# Patient Record
Sex: FEMALE | Race: OTHER | Employment: FULL TIME | URBAN - METROPOLITAN AREA
[De-identification: names, ages, dates, MRNs, and addresses within clinical notes are randomized per-mention and may not be internally consistent; named-entity substitution may affect disease eponyms.]

---

## 2017-01-04 ENCOUNTER — OFFICE VISIT (OUTPATIENT)
Dept: PODIATRY CLINIC | Facility: CLINIC | Age: 27
End: 2017-01-04

## 2017-01-04 ENCOUNTER — HOSPITAL ENCOUNTER (OUTPATIENT)
Dept: GENERAL RADIOLOGY | Facility: HOSPITAL | Age: 27
Discharge: HOME | End: 2017-01-04
Attending: PODIATRIST
Payer: COMMERCIAL

## 2017-01-04 DIAGNOSIS — M79.671 BILATERAL FOOT PAIN: ICD-10-CM

## 2017-01-04 DIAGNOSIS — M79.672 BILATERAL FOOT PAIN: ICD-10-CM

## 2017-01-04 DIAGNOSIS — M79.671 BILATERAL FOOT PAIN: Primary | ICD-10-CM

## 2017-01-04 DIAGNOSIS — M79.672 BILATERAL FOOT PAIN: Primary | ICD-10-CM

## 2017-01-04 DIAGNOSIS — M20.12 HALLUX VALGUS, LEFT: ICD-10-CM

## 2017-01-04 DIAGNOSIS — M20.11 HALLUX VALGUS, RIGHT: ICD-10-CM

## 2017-01-04 PROCEDURE — 99212 OFFICE O/P EST SF 10 MIN: CPT | Performed by: PODIATRIST

## 2017-01-04 PROCEDURE — 99243 OFF/OP CNSLTJ NEW/EST LOW 30: CPT | Performed by: PODIATRIST

## 2017-01-04 PROCEDURE — 73630 X-RAY EXAM OF FOOT: CPT

## 2017-01-04 NOTE — PROGRESS NOTES
HPI:    Patient ID: Leonora Christie is a 27year old female. HPI  This pleasant 27-year-old female presents as a new patient to me on consult from [address] She is accompanied today by family and I used the translation telephone line to communicate.   Hi understanding of my instructions. I did ask for further questions and did my best to answer. I believe that she is well informed. They seem to desire surgery in the very near future and we will schedule to her convenience.   I plan to do the left foot fi

## 2017-01-05 ENCOUNTER — TELEPHONE (OUTPATIENT)
Dept: PODIATRY CLINIC | Facility: CLINIC | Age: 27
End: 2017-01-05

## 2017-01-05 NOTE — TELEPHONE ENCOUNTER
Called pt using Language line Ugandan interpretor ID # [id number], Sin Ferraro -- Offered to schedule surgery for pt. Pt states she needs to discuss this with her . Informed pt that I will call her back next week. Pt verbalized understanding.

## 2017-01-18 NOTE — TELEPHONE ENCOUNTER
Called pt using Language Line Lithuanian interpretor ID # [id number] -- pt states her in law is having surgery in February and she will have to call back to let me know when she can have surgery.

## 2017-02-27 ENCOUNTER — TELEPHONE (OUTPATIENT)
Dept: PODIATRY CLINIC | Facility: CLINIC | Age: 27
End: 2017-02-27

## 2017-03-02 NOTE — TELEPHONE ENCOUNTER
[facility] [address] [facility] for outpatient surgery. No PA is required. Confirmation # [id number].

## 2017-03-02 NOTE — TELEPHONE ENCOUNTER
Called pt using Language Line Arabic interpretor ID # [id number] -- Scheduled surgery with [facility] at [address] on 03/29/17 for left foot bunionectomy.    Pt informed that [address] will be calling 1-2 days before surgery to discuss the following:  - Time of surgery and whe

## 2017-03-28 ENCOUNTER — TELEPHONE (OUTPATIENT)
Dept: PODIATRY CLINIC | Facility: CLINIC | Age: 27
End: 2017-03-28

## 2017-03-28 NOTE — TELEPHONE ENCOUNTER
Vinay Brown wants to know if patient should be moved up for surgery tomorrow since another patient will not be coming in. Please advise. Thank you.

## 2017-03-31 ENCOUNTER — TELEPHONE (OUTPATIENT)
Dept: PODIATRY CLINIC | Facility: CLINIC | Age: 27
End: 2017-03-31

## 2017-03-31 NOTE — TELEPHONE ENCOUNTER
Discussed with Dr. Sandip Gray nausea from Tylenol #3 given post op. Pt to stop T3's and may take ibuprfen three times a day with food and may take plain tylenol or tylenol extra strength  In between doses.   Discussed with pt's uncle Michelle Carrillo who is translating

## 2017-04-07 ENCOUNTER — OFFICE VISIT (OUTPATIENT)
Dept: PODIATRY CLINIC | Facility: CLINIC | Age: 27
End: 2017-04-07

## 2017-04-07 DIAGNOSIS — M20.12 HALLUX VALGUS, LEFT: Primary | ICD-10-CM

## 2017-04-07 PROCEDURE — 99024 POSTOP FOLLOW-UP VISIT: CPT | Performed by: PODIATRIST

## 2017-04-07 PROCEDURE — 99212 OFFICE O/P EST SF 10 MIN: CPT | Performed by: PODIATRIST

## 2017-04-07 NOTE — PROGRESS NOTES
HPI:    Patient ID: Artemio Schaffer is a 27year old female. HPI  56-year-old female presents 1 week postsurgical with no specific complaints or concerns. She is accompanied today by her dad.   Patient is doing very well in fact states that she is taking ib

## 2017-04-12 ENCOUNTER — HOSPITAL ENCOUNTER (OUTPATIENT)
Dept: GENERAL RADIOLOGY | Facility: HOSPITAL | Age: 27
Discharge: HOME | End: 2017-04-12
Attending: PODIATRIST
Payer: COMMERCIAL

## 2017-04-12 ENCOUNTER — OFFICE VISIT (OUTPATIENT)
Dept: PODIATRY CLINIC | Facility: CLINIC | Age: 27
End: 2017-04-12

## 2017-04-12 DIAGNOSIS — Z47.89 ORTHOPEDIC AFTERCARE: ICD-10-CM

## 2017-04-12 DIAGNOSIS — Z47.89 ORTHOPEDIC AFTERCARE: Primary | ICD-10-CM

## 2017-04-12 DIAGNOSIS — M20.12 HALLUX VALGUS, LEFT: ICD-10-CM

## 2017-04-12 PROCEDURE — 99024 POSTOP FOLLOW-UP VISIT: CPT | Performed by: PODIATRIST

## 2017-04-12 PROCEDURE — 73630 X-RAY EXAM OF FOOT: CPT

## 2017-04-12 PROCEDURE — 99212 OFFICE O/P EST SF 10 MIN: CPT | Performed by: PODIATRIST

## 2017-04-12 NOTE — PROGRESS NOTES
HPI:    Patient ID: Riya Case is a 27year old female. HPI  This 44-year-old female presents 2 weeks post left bunionectomy with no specific complaints are noted. She is accompanied by her dad.   Patient is not taking any pain medication since the las

## 2017-06-12 ENCOUNTER — OFFICE VISIT (OUTPATIENT)
Dept: PODIATRY CLINIC | Facility: CLINIC | Age: 27
End: 2017-06-12

## 2017-06-12 DIAGNOSIS — M20.12 HALLUX VALGUS, LEFT: Primary | ICD-10-CM

## 2017-06-12 PROCEDURE — 99212 OFFICE O/P EST SF 10 MIN: CPT | Performed by: PODIATRIST

## 2017-06-12 PROCEDURE — 99024 POSTOP FOLLOW-UP VISIT: CPT | Performed by: PODIATRIST

## 2017-06-12 NOTE — PROGRESS NOTES
HPI:    Patient ID: Guicho Bhatt is a 27year old female. HPI  [de-identified] 61-year-old female presents postsurgical with no specific complaints. She is aware of some degree of swelling and has discomfort that requires no medication. She is moderately active.   R

## 2017-08-22 ENCOUNTER — OFFICE VISIT (OUTPATIENT)
Dept: PODIATRY CLINIC | Facility: CLINIC | Age: 27
End: 2017-08-22

## 2017-08-22 DIAGNOSIS — M20.12 HALLUX VALGUS, LEFT: Primary | ICD-10-CM

## 2017-08-22 PROCEDURE — 99212 OFFICE O/P EST SF 10 MIN: CPT | Performed by: PODIATRIST

## 2017-08-23 NOTE — PROGRESS NOTES
HPI:    Patient ID: Lianne Ramirez is a 27year old female. HPI  This 53-year-old female presents postsurgical for bunionectomy left foot. On specific questioning she has no pain nor any noted concerns.   She is wearing closed shoes and is returned to her

## 2019-05-03 ENCOUNTER — OFFICE VISIT (OUTPATIENT)
Dept: FAMILY MEDICINE CLINIC | Facility: CLINIC | Age: 29
End: 2019-05-03
Payer: COMMERCIAL

## 2019-05-03 ENCOUNTER — LAB ENCOUNTER (OUTPATIENT)
Dept: LAB | Age: 29
End: 2019-05-03
Attending: FAMILY MEDICINE
Payer: COMMERCIAL

## 2019-05-03 ENCOUNTER — APPOINTMENT (OUTPATIENT)
Dept: LAB | Age: 29
End: 2019-05-03
Attending: FAMILY MEDICINE
Payer: COMMERCIAL

## 2019-05-03 VITALS
DIASTOLIC BLOOD PRESSURE: 74 MMHG | WEIGHT: 159 LBS | TEMPERATURE: 98 F | SYSTOLIC BLOOD PRESSURE: 115 MMHG | HEART RATE: 60 BPM | HEIGHT: 62 IN | BODY MASS INDEX: 29.26 KG/M2

## 2019-05-03 DIAGNOSIS — Z00.00 PHYSICAL EXAM: ICD-10-CM

## 2019-05-03 DIAGNOSIS — Z00.00 PHYSICAL EXAM: Primary | ICD-10-CM

## 2019-05-03 PROCEDURE — 80053 COMPREHEN METABOLIC PANEL: CPT

## 2019-05-03 PROCEDURE — 86304 IMMUNOASSAY TUMOR CA 125: CPT

## 2019-05-03 PROCEDURE — 81001 URINALYSIS AUTO W/SCOPE: CPT | Performed by: FAMILY MEDICINE

## 2019-05-03 PROCEDURE — 90471 IMMUNIZATION ADMIN: CPT | Performed by: FAMILY MEDICINE

## 2019-05-03 PROCEDURE — 81015 MICROSCOPIC EXAM OF URINE: CPT | Performed by: FAMILY MEDICINE

## 2019-05-03 PROCEDURE — 85025 COMPLETE CBC W/AUTO DIFF WBC: CPT

## 2019-05-03 PROCEDURE — 90715 TDAP VACCINE 7 YRS/> IM: CPT | Performed by: FAMILY MEDICINE

## 2019-05-03 PROCEDURE — 99395 PREV VISIT EST AGE 18-39: CPT | Performed by: FAMILY MEDICINE

## 2019-05-03 PROCEDURE — 80061 LIPID PANEL: CPT

## 2019-05-03 PROCEDURE — 83036 HEMOGLOBIN GLYCOSYLATED A1C: CPT

## 2019-05-03 PROCEDURE — 84443 ASSAY THYROID STIM HORMONE: CPT

## 2019-05-03 PROCEDURE — 36415 COLL VENOUS BLD VENIPUNCTURE: CPT

## 2019-05-03 PROCEDURE — 93005 ELECTROCARDIOGRAM TRACING: CPT

## 2019-05-03 PROCEDURE — 93010 ELECTROCARDIOGRAM REPORT: CPT | Performed by: FAMILY MEDICINE

## 2019-05-03 NOTE — PROGRESS NOTES
5/3/2019  8:04 AM    Kurt Morillo is a 34year old female. Chief complaint(s): Patient presents with:  Routine Physical    HPI:     Kurt Morillo primary complaint is regarding CPE.      Kurt Morillo is a 34year old female present today for a routine period disturbance. Respiratory: Negative for apnea, cough, chest tightness, shortness of breath and wheezing. Cardiovascular: Negative for chest pain, palpitations and leg swelling.    Gastrointestinal: Negative for heartburn, nausea, vomiting, abdominal mary ann condyloma. There is no vaginal discharge , cervix with no motion tenderness. Adnexal with no masses or tenderness uterine normal appropriate size without tenderness. Musculoskeletal:   Spine without scoliosis or kyphosis.   Range of motions of both uppe dental care with her dentist), and healthy habits & social competence & responsibilities: Recommendations on physical activity; exercise daily or at least 3 times a week for 30-60 minutes doing cardiovascular exercise.  Patient educated on self breast exami

## 2019-05-07 ENCOUNTER — TELEPHONE (OUTPATIENT)
Dept: FAMILY MEDICINE CLINIC | Facility: CLINIC | Age: 29
End: 2019-05-07

## 2019-05-07 NOTE — TELEPHONE ENCOUNTER
Result Notes for URINALYSIS, ROUTINE     Notes recorded by Elisa Barber MD on 5/3/2019 at 2:50 PM CDT  Please call patient the following tests results were within k[place] limits; CBC, CMP, Lipids, TSH, UA, a1c     Please call patient after 4 p.m.  For n

## 2019-05-08 NOTE — TELEPHONE ENCOUNTER
With Language line #[id number], advised Dr Annika Gill notes and verbalized understanding.       Notes recorded by Prince Welsh MD on 5/3/2019 at 2:50 PM CDT  Please call patient the following tests results were within kierra limits; CBC, CMP, Lipids, TSH, UA

## 2019-05-10 ENCOUNTER — TELEPHONE (OUTPATIENT)
Dept: FAMILY MEDICINE CLINIC | Facility: CLINIC | Age: 29
End: 2019-05-10

## 2019-05-10 NOTE — TELEPHONE ENCOUNTER
----- Message from Deanna Cardoza MD sent at 5/5/2019 12:06 PM CDT -----  Please call patient, results are within normal limits. GC negative

## 2020-03-25 ENCOUNTER — NURSE TRIAGE (OUTPATIENT)
Dept: FAMILY MEDICINE CLINIC | Facility: CLINIC | Age: 30
End: 2020-03-25

## 2020-03-25 NOTE — TELEPHONE ENCOUNTER
Action Requested: Summary for Provider     []  Critical Lab, Recommendations Needed  [] Need Additional Advice  []   FYI    []   Need Orders  [] Need Medications Sent to Pharmacy  []  Other     SUMMARY:   Patient states her coworker's sister test positive

## 2020-03-25 NOTE — TELEPHONE ENCOUNTER
Phone call made patient reports of having symptoms of URI which include sore throat like cough and runny nose. Patient was exposed to someone who was exposed to coronavirus but not directly. She has had no fevers, no vomiting or diarrhea.  Patient was infor
